# Patient Record
Sex: FEMALE
[De-identification: names, ages, dates, MRNs, and addresses within clinical notes are randomized per-mention and may not be internally consistent; named-entity substitution may affect disease eponyms.]

---

## 2023-02-10 ENCOUNTER — NURSE TRIAGE (OUTPATIENT)
Dept: OTHER | Facility: CLINIC | Age: 53
End: 2023-02-10

## 2023-02-10 NOTE — TELEPHONE ENCOUNTER
Location of patient: [place]    Subjective: Caller states \"tingling in thigh\"     Current Symptoms: tingling in thigh top right side no weakness in face or arm no injury no dizziness no blurred vision no dif walking no headache medical hx none     Onset:  2 days    Associated Symptoms: NA    Pain Severity: 3/10    Temperature: none       What has been tried: heating pad , walking     LMP: NA Pregnant: NA    Recommended disposition: Go to [facility]    Care advice provided, patient verbalizes understanding; denies any other questions or concerns; instructed to call back for any new or worsening symptoms. Patient/caller agrees to follow-up with PCP     This triage is a result of a call to [address] [place] [address]. Please do not respond to the triage nurse through this encounter. Any subsequent communication should be directly with the patient.       Reason for Disposition   Tingling (e.g., pins and needles) of the face, arm or leg on one side of the body, that is present now (Exceptions: Chronic or recurrent symptom lasting > 4 weeks; or tingling from known cause, such as: bumped elbow, carpal tunnel syndrome, pinched nerve, frostbite.)    Protocols used: Neurologic Deficit-ADULT-[place]